# Patient Record
Sex: MALE | Race: WHITE | NOT HISPANIC OR LATINO | Employment: FULL TIME | URBAN - METROPOLITAN AREA
[De-identification: names, ages, dates, MRNs, and addresses within clinical notes are randomized per-mention and may not be internally consistent; named-entity substitution may affect disease eponyms.]

---

## 2017-11-28 ENCOUNTER — ALLSCRIPTS OFFICE VISIT (OUTPATIENT)
Dept: OTHER | Facility: OTHER | Age: 52
End: 2017-11-28

## 2017-11-29 NOTE — PROGRESS NOTES
Assessment    1  Acute bronchitis with bronchospasm (466 0) (J20 9)    Plan  Acute bronchitis with bronchospasm    · Azithromycin 250 MG Oral Tablet; TAKE 2 TABLETS ON DAY 1 THEN TAKE 1TABLET A DAY FOR 4 DAYS   · Bromfed DM 30-2-10 MG/5ML Oral Syrup; TAKE 5 ML EVERY 4 TO 6 HOURS ASNEEDED   · PredniSONE 20 MG Oral Tablet; Take 1 tablet twice daily   · ProAir  (90 Base) MCG/ACT Inhalation Aerosol Solution; INHALE 1 TO 2PUFFS EVERY 4 TO 6 HOURS AS NEEDED    Discussion/Summary  Discussion Summary:   Start abx, steroid  Use cough med and use inhaler as needed  if not better in 1 wk  Medication SE Review and Pt Understands Tx: Possible side effects of new medications were reviewed with the patient/guardian today  The treatment plan was reviewed with the patient/guardian  The patient/guardian understands and agrees with the treatment plan      Chief Complaint  Chief Complaint Free Text Note Form: Patient seen in office today to re-establish as a patient due to being sick - productive cough with clear sputum for over a week, congestion and runny nose  He stated that he has been taking Theraflu w/o relief  History of Present Illness  HPI: 46year old here for cough, congestion  Symptoms improved but then got worse  States he is a slight asthmatic and has been having a little trouble breathing  No fever or muscle aches  Theraflu with out relief  Review of Systems  Complete-Male:  Constitutional: feeling poorly-- and-- feeling tired, but-- no fever-- and-- no chills  ENT: as noted in HPI  Cardiovascular: No complaints of slow heart rate, no fast heart rate, no chest pain, no palpitations, no leg claudication, no lower extremity  Respiratory: as noted in HPI  Gastrointestinal: No complaints of abdominal pain, no constipation, no nausea or vomiting, no diarrhea or bloody stools  Active Problems  1  Back pain (724 5) (M54 9)   2  Low back pain (724 2) (M54 5)   3   Morbid or severe obesity due to excess calories (278 01) (E66 01)   4  Polydipsia (783 5) (R63 1)    Past Medical History  1  History of low back pain (V13 59) (Z87 39)  Active Problems And Past Medical History Reviewed: The active problems and past medical history were reviewed and updated today  Surgical History  1  History of Neuroplasty Decompression Median Nerve At Carpal Tunnel  Surgical History Reviewed: The surgical history was reviewed and updated today  Family History  Mother    1  No pertinent family history  Father    2  No pertinent family history    Social History   · Alcohol use  Social History Reviewed: The social history was reviewed and updated today  Current Meds   1  No Reported Medications Recorded    Allergies  1  No Known Drug Allergies    Vitals  Vital Signs    Recorded: 37CCT0669 03:16PM   Temperature 98 6 F   Heart Rate 72   Systolic 624   Diastolic 76   Height 5 ft 8 5 in   Weight 224 lb 6 08 oz   BMI Calculated 33 62   BSA Calculated 2 15   O2 Saturation 98       Physical Exam   Constitutional  General appearance: No acute distress, well appearing and well nourished  Ears, Nose, Mouth, and Throat  Otoscopic examination: Tympanic membrance translucent with normal light reflex  Canals patent without erythema  Oropharynx: Normal with no erythema, edema, exudate or lesions  Pulmonary  Respiratory effort: Abnormal   Respiratory Findings: dry cough  Auscultation of lungs: Abnormal   Auscultation of the lungs revealed expiratory wheezing  Cardiovascular  Auscultation of heart: Normal rate and rhythm, normal S1 and S2, without murmurs           Signatures   Electronically signed by : Ford Aj MD; Nov 28 2017  3:30PM EST                       (Author)

## 2018-01-13 VITALS
BODY MASS INDEX: 33.23 KG/M2 | DIASTOLIC BLOOD PRESSURE: 76 MMHG | OXYGEN SATURATION: 98 % | TEMPERATURE: 98.6 F | WEIGHT: 224.38 LBS | SYSTOLIC BLOOD PRESSURE: 116 MMHG | HEART RATE: 72 BPM | HEIGHT: 69 IN

## 2021-04-15 DIAGNOSIS — Z23 ENCOUNTER FOR IMMUNIZATION: ICD-10-CM

## 2021-08-30 ENCOUNTER — OFFICE VISIT (OUTPATIENT)
Dept: FAMILY MEDICINE CLINIC | Facility: CLINIC | Age: 56
End: 2021-08-30
Payer: COMMERCIAL

## 2021-08-30 VITALS
BODY MASS INDEX: 33.95 KG/M2 | DIASTOLIC BLOOD PRESSURE: 88 MMHG | HEART RATE: 76 BPM | OXYGEN SATURATION: 97 % | TEMPERATURE: 98.2 F | HEIGHT: 68 IN | WEIGHT: 224 LBS | SYSTOLIC BLOOD PRESSURE: 130 MMHG

## 2021-08-30 DIAGNOSIS — Z12.5 SCREENING FOR PROSTATE CANCER: ICD-10-CM

## 2021-08-30 DIAGNOSIS — Z13.220 SCREENING, LIPID: ICD-10-CM

## 2021-08-30 DIAGNOSIS — Z13.1 SCREENING FOR DIABETES MELLITUS: ICD-10-CM

## 2021-08-30 DIAGNOSIS — L30.4 INTERTRIGO: ICD-10-CM

## 2021-08-30 DIAGNOSIS — M62.830 LUMBAR PARASPINAL MUSCLE SPASM: Primary | ICD-10-CM

## 2021-08-30 DIAGNOSIS — M24.822 ELBOW LOCKING, LEFT: ICD-10-CM

## 2021-08-30 PROCEDURE — 1036F TOBACCO NON-USER: CPT | Performed by: FAMILY MEDICINE

## 2021-08-30 PROCEDURE — 99204 OFFICE O/P NEW MOD 45 MIN: CPT | Performed by: FAMILY MEDICINE

## 2021-08-30 PROCEDURE — 3008F BODY MASS INDEX DOCD: CPT | Performed by: FAMILY MEDICINE

## 2021-08-30 PROCEDURE — 3725F SCREEN DEPRESSION PERFORMED: CPT | Performed by: FAMILY MEDICINE

## 2021-08-30 RX ORDER — METHOCARBAMOL 500 MG/1
500 TABLET, FILM COATED ORAL 3 TIMES DAILY PRN
Qty: 20 TABLET | Refills: 0 | Status: SHIPPED | OUTPATIENT
Start: 2021-08-30 | End: 2022-03-24

## 2021-08-30 RX ORDER — KETOCONAZOLE 20 MG/G
CREAM TOPICAL DAILY
Qty: 15 G | Refills: 0 | Status: SHIPPED | OUTPATIENT
Start: 2021-08-30 | End: 2022-03-24

## 2021-08-30 NOTE — PROGRESS NOTES
Nigel Power 1965 male MRN: [id number]      ASSESSMENT/PLAN  Problem List Items Addressed This Visit     None      Visit Diagnoses     Lumbar paraspinal muscle spasm    -  Primary    Relevant Medications    methocarbamol (ROBAXIN) 500 mg tablet    Intertrigo        Relevant Medications    ketoconazole (NIZORAL) 2 % cream    Elbow locking, left        Relevant Orders    XR elbow 2 vw left    Ambulatory referral to Orthopedic Surgery    Screening for diabetes mellitus        Relevant Orders    Comprehensive metabolic panel    Screening, lipid        Relevant Orders    Lipid panel    Screening for prostate cancer        Relevant Orders    PSA, Total Screen        Back pain -- consistent with paraspinal muscle injury  Trial Robaxin -- reviewed possible ADRs including somnolence, encouraged not to take prior to work or driving  Also encouraged PT, which pt defers at this time  Intertrigo -- start topical anti-fungal     Elbow locking -- likely secondary to arthritis; will XR and refer to Ortho     BP WNL   BMI as below   CMP + Lipids to screen for HLD, DM   HIV, Hep C screening deferred per pt request   PSA ordered   CRC DUE -- pt defers   Encouraged regular dental and eye exams     BMI Counseling: Body mass index is 34 06 kg/m²  The BMI is above normal  Nutrition recommendations include 3-5 servings of fruits/vegetables daily  Exercise recommendations include exercising 3-5 times per week  No future appointments  SUBJECTIVE  CC: groin pull and Back Pain      HPI:  Nigel Power is a 64 y o  male who presents to establish care  History reviewed and updated as below       Was pulling a pallet of ice and developed pain in his R groin and low back about 1 month ago   Initially couldn't put pressure on his leg, but this has improved   Does have a history of sciatica flare ups   No GI/ symptoms   Tried a muscle relaxer once, with minimal relief     Has a patch of irritated skin on his L inner thigh, gets itchy/flakey   Has been present for 1-2 years   Constant, but sometimes flares up worse than at other times     L elbow, has difficulty straightening back out x3 weeks ago   Pt states he was told he has an extra bone in his elbow     Has a spot that sticks out at the bottom of rib cage -- has been present for "years", but seems more pronounced lately   Sometimes hurts more when he bends over     Review of Systems   Constitutional: Negative for unexpected weight change  HENT: Negative for congestion, ear pain, rhinorrhea and sore throat  Eyes: Negative for visual disturbance (wears glasses )  Respiratory: Negative for cough and shortness of breath ("slight asthmatic" -- when it is very hot or cold, can have asthma attacks)  Cardiovascular: Negative for chest pain, palpitations and leg swelling  Gastrointestinal: Negative for abdominal pain, constipation and diarrhea  Denies stool incontinence/retention    Endocrine: Negative for polyuria  Genitourinary: Negative for dysuria  Denies urinary incontinence/retention    Musculoskeletal: Positive for back pain and myalgias  Skin: Positive for rash  Neurological: Negative for dizziness and headaches  Psychiatric/Behavioral: Negative for sleep disturbance  Historical Information   The patient history was reviewed and updated as follows:    History reviewed  No pertinent past medical history    Past Surgical History:   Procedure Laterality Date    APPENDECTOMY      HAND SURGERY Right     nerve damage     Family History   Problem Relation Age of Onset    Dementia Mother       Social History   Social History     Substance and Sexual Activity   Alcohol Use Yes    Comment: Occasionally      Social History     Substance and Sexual Activity   Drug Use Not Currently     Social History     Tobacco Use   Smoking Status Never Smoker   Smokeless Tobacco Former User       Medications:     Current Outpatient Medications:     ketoconazole (NIZORAL) 2 % cream, Apply topically daily, Disp: 15 g, Rfl: 0    methocarbamol (ROBAXIN) 500 mg tablet, Take 1 tablet (500 mg total) by mouth 3 (three) times a day as needed for muscle spasms, Disp: 20 tablet, Rfl: 0  No Known Allergies    OBJECTIVE    Vitals:   Vitals:    08/30/21 1352   BP: 130/88   Pulse: 76   Temp: 98 2 °F (36 8 °C)   SpO2: 97%   Weight: 102 kg (224 lb)   Height: 5' 8" (1 727 m)           Physical Exam  Vitals and nursing note reviewed  Constitutional:       General: He is not in acute distress  Appearance: Normal appearance  HENT:      Head: Normocephalic and atraumatic  Right Ear: Tympanic membrane, ear canal and external ear normal       Left Ear: Tympanic membrane, ear canal and external ear normal       Nose: Nose normal       Mouth/Throat:      Mouth: Mucous membranes are moist       Pharynx: No oropharyngeal exudate or posterior oropharyngeal erythema  Eyes:      Conjunctiva/sclera: Conjunctivae normal    Cardiovascular:      Rate and Rhythm: Normal rate and regular rhythm  Pulmonary:      Effort: Pulmonary effort is normal  No respiratory distress  Breath sounds: Normal breath sounds  Abdominal:      General: Bowel sounds are normal  There is no distension  Palpations: Abdomen is soft  Tenderness: There is no abdominal tenderness  Musculoskeletal:      Right lower leg: No edema  Left lower leg: No edema  Comments: Non tender to palpation over length of spine  Tender along lower thoracic and lumbar paraspinals     L elbow non-tender to palpation   No significant crepitus noted with F/E   Lymphadenopathy:      Cervical: No cervical adenopathy  Skin:     General: Skin is warm and dry  Comments: Large confluent area of thickened, hyperpigmented skin suggestive of intertrigo    Neurological:      General: No focal deficit present  Mental Status: He is alert     Psychiatric:         Mood and Affect: Mood normal  DO Aaliyah Wor[facility]   8/30/2021  2:32 PM

## 2022-03-24 ENCOUNTER — OFFICE VISIT (OUTPATIENT)
Dept: FAMILY MEDICINE CLINIC | Facility: CLINIC | Age: 57
End: 2022-03-24
Payer: COMMERCIAL

## 2022-03-24 VITALS
DIASTOLIC BLOOD PRESSURE: 80 MMHG | HEIGHT: 68 IN | TEMPERATURE: 97.2 F | SYSTOLIC BLOOD PRESSURE: 110 MMHG | WEIGHT: 213 LBS | OXYGEN SATURATION: 97 % | BODY MASS INDEX: 32.28 KG/M2 | HEART RATE: 63 BPM

## 2022-03-24 DIAGNOSIS — F43.21 GRIEF: ICD-10-CM

## 2022-03-24 DIAGNOSIS — G89.29 CHRONIC RIGHT SHOULDER PAIN: Primary | ICD-10-CM

## 2022-03-24 DIAGNOSIS — M25.511 CHRONIC RIGHT SHOULDER PAIN: Primary | ICD-10-CM

## 2022-03-24 PROCEDURE — 1036F TOBACCO NON-USER: CPT | Performed by: FAMILY MEDICINE

## 2022-03-24 PROCEDURE — 3008F BODY MASS INDEX DOCD: CPT | Performed by: FAMILY MEDICINE

## 2022-03-24 PROCEDURE — 99214 OFFICE O/P EST MOD 30 MIN: CPT | Performed by: FAMILY MEDICINE

## 2022-03-24 NOTE — PROGRESS NOTES
Seth Watters 1965 male MRN: [id number]      ASSESSMENT/PLAN  Problem List Items Addressed This Visit     None      Visit Diagnoses     Chronic right shoulder pain    -  Primary    Relevant Orders    XR shoulder 2+ vw right    Ambulatory Referral to Orthopedic Surgery    Grief        Relevant Medications    sertraline (Zoloft) 50 mg tablet        Exam suggestive of rotator cuff pathology  Will get XR and refer to Ortho for further evaluation  Possible that tremor is related, as it is intermittent, new onset, and in same extremity as worsening shoulder dysfunction  Will continue to monitor  Reviewed various options for management of depression/anxiety in setting of grief including therapy and medication  Pt agreeable to medication  Start Zoloft  Discussed possible ADRs including GI upset, somnolence, initial worsening of symptoms  Reviewed delayed onset to max therapeutic potential  F/u in 4 weeks to monitor, to call if not tolerating prior  No future appointments  SUBJECTIVE  CC: Shaking (hands are both shaking  Pt states when working not so bad but once he sits down his hands shake "uncontrollably"), Shoulder Pain (right shoulder pain-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-thinks that they have injured it  limited ROM ), Back Pain (low back pain), and Follow-up (recently lost wife and is having a difficult time  has not had a true check up in about 10 years )      HPI:  Seth Watters is a 64 y o  male who presents due to multiple concerns as detailed below  Tremor -- R hand constantly shaking -- started about 2-3 weeks ago; some days it is not there, can control it while he is working  Did have multiple surgeries on the hand -- one stent, carpal tunnel x3   Shoulder Pain -- R "I screwed up something bad" -- has been bothering him for about 1 year; radiates into neck and arm; does have a history of dislocation as a child  Notes that it sticks out more in the front     No numbness/tingling in his hand  Sometimes arm feels cold to touch  Grief -- "I think my nerves are just about gone"; his wife passed at the end of February; has resources through work  Whenever he "peels back layers" through the house, it gets you  Having trouble sleeping  Only eating once per day because he keeps working  Review of Systems   Musculoskeletal: Positive for arthralgias  Neurological: Positive for tremors  Psychiatric/Behavioral: Positive for sleep disturbance  The patient is nervous/anxious  Historical Information   The patient history was reviewed and updated as follows:    History reviewed  No pertinent past medical history  Past Surgical History:   Procedure Laterality Date    APPENDECTOMY      HAND SURGERY Right     nerve damage     Family History   Problem Relation Age of Onset    Dementia Mother       Social History   Social History     Substance and Sexual Activity   Alcohol Use Yes    Comment: Occasionally      Social History     Substance and Sexual Activity   Drug Use Not Currently     Social History     Tobacco Use   Smoking Status Never Smoker   Smokeless Tobacco Former User       Medications:     Current Outpatient Medications:     sertraline (Zoloft) 50 mg tablet, Take 1/2 tablet by mouth daily x7 days, then take 1 tablet by mouth daily, Disp: 30 tablet, Rfl: 1  No Known Allergies    OBJECTIVE    Vitals:   Vitals:    03/24/22 1530   BP: 110/80   BP Location: Left arm   Patient Position: Sitting   Cuff Size: Large   Pulse: 63   Temp: (!) 97 2 °F (36 2 °C)   SpO2: 97%   Weight: 96 6 kg (213 lb)   Height: 5' 8" (1 727 m)           Physical Exam  Vitals and nursing note reviewed  Constitutional:       General: He is not in acute distress  Appearance: Normal appearance  HENT:      Head: Normocephalic and atraumatic  Pulmonary:      Effort: Pulmonary effort is normal  No respiratory distress     Musculoskeletal:      Comments: R shoulder  No obvious deformity Tender to palpation over AC joint   ROM decreased with IR due to pain  Equivocal Gaby Pritchett's, (+) Empty Can     R hand  No tremor during exam   Capillary refill <2 seconds in all digits   Radial pulse 2+   Neurological:      General: No focal deficit present  Mental Status: He is alert     Psychiatric:         Mood and Affect: Mood normal                     Ciarra Navraro DO  [facility]   3/24/2022  3:53 PM

## 2022-03-25 ENCOUNTER — APPOINTMENT (OUTPATIENT)
Dept: RADIOLOGY | Facility: CLINIC | Age: 57
End: 2022-03-25
Payer: COMMERCIAL

## 2022-03-25 DIAGNOSIS — M25.511 CHRONIC RIGHT SHOULDER PAIN: ICD-10-CM

## 2022-03-25 DIAGNOSIS — G89.29 CHRONIC RIGHT SHOULDER PAIN: ICD-10-CM

## 2022-03-25 PROCEDURE — 73030 X-RAY EXAM OF SHOULDER: CPT

## 2022-04-14 VITALS
SYSTOLIC BLOOD PRESSURE: 121 MMHG | BODY MASS INDEX: 32.28 KG/M2 | DIASTOLIC BLOOD PRESSURE: 83 MMHG | WEIGHT: 213 LBS | HEART RATE: 64 BPM | HEIGHT: 68 IN

## 2022-04-14 DIAGNOSIS — M25.511 CHRONIC RIGHT SHOULDER PAIN: ICD-10-CM

## 2022-04-14 DIAGNOSIS — G89.29 CHRONIC RIGHT SHOULDER PAIN: ICD-10-CM

## 2022-04-14 DIAGNOSIS — M75.81 TENDINITIS OF RIGHT ROTATOR CUFF: Primary | ICD-10-CM

## 2022-04-14 DIAGNOSIS — M75.41 IMPINGEMENT SYNDROME OF RIGHT SHOULDER: ICD-10-CM

## 2022-04-14 DIAGNOSIS — M62.838 TRAPEZIUS MUSCLE SPASM: ICD-10-CM

## 2022-04-14 PROCEDURE — 99203 OFFICE O/P NEW LOW 30 MIN: CPT | Performed by: PHYSICIAN ASSISTANT

## 2022-04-14 PROCEDURE — 20610 DRAIN/INJ JOINT/BURSA W/O US: CPT | Performed by: PHYSICIAN ASSISTANT

## 2022-04-14 RX ORDER — METHYLPREDNISOLONE ACETATE 40 MG/ML
2 INJECTION, SUSPENSION INTRA-ARTICULAR; INTRALESIONAL; INTRAMUSCULAR; SOFT TISSUE
Status: COMPLETED | OUTPATIENT
Start: 2022-04-14 | End: 2022-04-14

## 2022-04-14 RX ORDER — LIDOCAINE HYDROCHLORIDE 10 MG/ML
2 INJECTION, SOLUTION INFILTRATION; PERINEURAL
Status: COMPLETED | OUTPATIENT
Start: 2022-04-14 | End: 2022-04-14

## 2022-04-14 RX ORDER — BUPIVACAINE HYDROCHLORIDE 2.5 MG/ML
2 INJECTION, SOLUTION INFILTRATION; PERINEURAL
Status: COMPLETED | OUTPATIENT
Start: 2022-04-14 | End: 2022-04-14

## 2022-04-14 RX ADMIN — METHYLPREDNISOLONE ACETATE 2 ML: 40 INJECTION, SUSPENSION INTRA-ARTICULAR; INTRALESIONAL; INTRAMUSCULAR; SOFT TISSUE at 09:22

## 2022-04-14 RX ADMIN — LIDOCAINE HYDROCHLORIDE 2 ML: 10 INJECTION, SOLUTION INFILTRATION; PERINEURAL at 09:22

## 2022-04-14 RX ADMIN — BUPIVACAINE HYDROCHLORIDE 2 ML: 2.5 INJECTION, SOLUTION INFILTRATION; PERINEURAL at 09:22

## 2022-04-14 NOTE — PROGRESS NOTES
Patient Name:  Modesto Ribeiro  MRN:  [id number]    [address] [place] [address]     1  Tendinitis of right rotator cuff  -     Ambulatory Referral to Physical Therapy; Future    2  Chronic right shoulder pain  -     Ambulatory Referral to Orthopedic Surgery  -     Ambulatory Referral to Physical Therapy; Future    3  Trapezius muscle spasm  -     Ambulatory Referral to Physical Therapy; Future        64 y o  male with Right shoulder rotator cuff tendonitis and upper trapezius spasm  X-rays reviewed in office today with patient  Nonoperative management discussed with patient regarding rotator cuff tenonitis and upper trapezius spasm including outpatient PT to work on proper lifting mechanics, graston/manual therapy upper trapezius, strengthening of periscapular/postural and rotator cuff musculature, full passive and active range of motion, corticosteroid injections, topical voltaren vs OTC oral analgesics as needed for pain relief  At this time patient verbalizes difficulty wit schedule to get into outpatient PT; advised patient this will be cornerstone of improvement of pain and improving strength  He would also like to receive corticosteroid injection today  Risks and benefits of corticosteroid injection were discussed in detail  Risks including:  Post injection pain, elevation in blood sugar, skin discoloration, fatty atrophy, and infection were discussed in detail  The patient understood and elected to proceed forward  After sterile preparation the Right subacromial bursa was injected with 2 cc of 1% lidocaine, 2 cc of 0 25% bupivacaine, and 2 cc of Depo-Medrol  The patient tolerated the procedure and no immediate complications were noted  The patient was instructed to ice and elevate the injection site, limit strenuous activity for the next 24-48 hours, and contact us if there were any questions or concerns prior to their follow-up appointment    They were also instructed to contact their primary care physician to discuss elevated blood sugar; patient denies history of diabetes  I will see the patient back in 8 weeks for reevaluation of Right shoulder  If Right shoulder symptoms persist with associated weakness and pain, can consider additional imaging if indicated  Chief Complaint     Right shoulder pain    History of the Present Illness     Aurora Stiles is a RHD 64 y o  male with Right shoulder/neck pain ongoing for a couple months  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 her in a wheel chair  He remembers a few times at the grocery store where he was pushing the wheelchair and pulling a cart which caused some neck/upper trapezius pain  He does admit to pain, which is tolerable; denies weakness  Admits to pain if Right side lying  Today, patient locates pain to upper trapezius and lateral aspect of Right shoulder  He occasionally administers 3 tablets of 500mg Tylenol  Admits to 3 year history of 5ft fall onto Right shoulder without complications or fracture; he did not seek out medical care after fall  He also admits to history of Right shoulder subluxation as a child  Review of Systems     Review of Systems   Constitutional: Negative for chills and fever  HENT: Negative for ear pain and sore throat  Eyes: Negative for pain and visual disturbance  Respiratory: Negative for cough and shortness of breath  Cardiovascular: Negative for chest pain and palpitations  Gastrointestinal: Negative for abdominal pain and vomiting  Genitourinary: Negative for dysuria and hematuria  Musculoskeletal: Negative for arthralgias and back pain  Skin: Negative for color change and rash  Neurological: Negative for seizures and syncope  All other systems reviewed and are negative  Physical Exam     /83   Pulse 64   Ht 5' 8" (1 727 m)   Wt 96 6 kg (213 lb)   BMI 32 39 kg/m²     Right Shoulder:    Active range of motion   150-160 degrees forward flexion  150-160 degrees abduction  70 degrees external rotation   4 level restriction internal rotation      Passive range of motion   160-170 degrees of forward flexion   There is tenderness present over the upper trapezius, AC joint, proximal biceps tendon,   There is 5/5 strength with external rotation testing at the side  Empty can testing is negative   Belly press test is negative  Francois test is positive  [place]'s test is positive   Speed's test is Negative   Positive cross arm adduction test  The patient is neurovascularly intact distally in the extremity  Eyes:  Anicteric sclerae  Neck:  Supple  Lungs:  Normal respiratory effort  Cardiovascular:  Capillary refill is less than 2 seconds  Skin:  Intact without erythema  Neurologic:  Sensation grossly intact to light touch  Psychiatric:  Mood and affect are appropriate  Data Review     I have personally reviewed pertinent films in PACS, and my interpretation follows:    X-rays taken of Right shoulder demonstrates minimal to mild early glenohumeral osteoarthritis without fracture or dislocation  AC joint osteoarthritis noted  History reviewed  No pertinent past medical history  Past Surgical History:   Procedure Laterality Date    APPENDECTOMY      HAND SURGERY Right     nerve damage       No Known Allergies    Current Outpatient Medications on File Prior to Visit   Medication Sig Dispense Refill    sertraline (Zoloft) 50 mg tablet Take 1/2 tablet by mouth daily x7 days, then take 1 tablet by mouth daily 30 tablet 1     No current facility-administered medications on file prior to visit         Social History     Tobacco Use    Smoking status: Never Smoker    Smokeless tobacco: Former User   Vaping Use    Vaping Use: Never used   Substance Use Topics    Alcohol use: Yes     Comment: Occasionally     Drug use: Not Currently       Family History   Problem Relation Age of Onset    Dementia Mother              Procedures Performed     Large joint arthrocentesis: R subacromial bursa  Universal Protocol:  Consent given by: patient  Patient identity confirmed: verbally with patient    Supporting Documentation  Indications: pain   Procedure Details  Location: shoulder - R subacromial bursa  Needle size: 22 G  Approach: lateral  Medications administered: 2 mL bupivacaine 0 25 %; 2 mL lidocaine 1 %; 2 mL methylPREDNISolone acetate 40 mg/mL    Patient tolerance: patient tolerated the procedure well with no immediate complications  Dressing:  Sterile dressing applied              Juan Simpson PA-C

## 2022-04-21 ENCOUNTER — OFFICE VISIT (OUTPATIENT)
Dept: FAMILY MEDICINE CLINIC | Facility: CLINIC | Age: 57
End: 2022-04-21
Payer: COMMERCIAL

## 2022-04-21 VITALS
TEMPERATURE: 97 F | BODY MASS INDEX: 32.28 KG/M2 | SYSTOLIC BLOOD PRESSURE: 118 MMHG | DIASTOLIC BLOOD PRESSURE: 78 MMHG | OXYGEN SATURATION: 99 % | HEIGHT: 68 IN | WEIGHT: 213 LBS | HEART RATE: 55 BPM

## 2022-04-21 DIAGNOSIS — F43.21 GRIEF: Primary | ICD-10-CM

## 2022-04-21 DIAGNOSIS — R25.2 LEG CRAMPS: ICD-10-CM

## 2022-04-21 PROCEDURE — 99214 OFFICE O/P EST MOD 30 MIN: CPT | Performed by: FAMILY MEDICINE

## 2022-04-21 PROCEDURE — 1036F TOBACCO NON-USER: CPT | Performed by: FAMILY MEDICINE

## 2022-04-21 PROCEDURE — 3008F BODY MASS INDEX DOCD: CPT | Performed by: FAMILY MEDICINE

## 2022-04-21 RX ORDER — SERTRALINE HYDROCHLORIDE 100 MG/1
100 TABLET, FILM COATED ORAL DAILY
Qty: 30 TABLET | Refills: 1 | Status: SHIPPED | OUTPATIENT
Start: 2022-04-21 | End: 2022-07-18 | Stop reason: SDUPTHER

## 2022-04-21 NOTE — PROGRESS NOTES
Carmelo Hoyos 1965 male MRN: [id number]      ASSESSMENT/PLAN  Problem List Items Addressed This Visit     None      Visit Diagnoses     Grief    -  Primary    Relevant Medications    sertraline (Zoloft) 100 mg tablet    Leg cramps            Some improvement in symptoms with Zoloft, though still persistent -- trial increase to 100 mg daily and f/u in 4 weeks to monitor  If sleep disturbance still present, could consider sleep aid such as Trazodone or Hydroxyzine  Encouraged cutting back on soda and increasing water intake to help with leg cramping  Future Appointments   Date Time Provider Maribell Perry   4/21/2022  8:40 AM DO LETY Swanson [facility]   4/28/2022 11:30 AM Flori Walls [facility]   6/9/2022  9:15 AM DO CYNDY Spencer [facility]          SUBJECTIVE  CC: Follow-up (Feels like he's doing good  Still waking up multiple times at night ) and leg cramps (pt complaining of leg cramps at night in both legs)      HPI:  Carmelo Hoyos is a 64 y o  male who presents for medication follow up  Initiated on Zoloft -- tolerating without issue; still having difficulty sleeping at night (falling asleep without issue, but wakes up and can't get back to sleep)  Does note some improvement in symptoms     Pt is having leg cramps at night -- he has been drinking a lot of soda and no water    Review of Systems   Musculoskeletal: Positive for myalgias (leg cramps at night)  Psychiatric/Behavioral: Positive for sleep disturbance  Historical Information   The patient history was reviewed and updated as follows:    No past medical history on file    Past Surgical History:   Procedure Laterality Date    APPENDECTOMY      HAND SURGERY Right     nerve damage     Family History   Problem Relation Age of Onset    Dementia Mother       Social History   Social History     Substance and Sexual Activity   Alcohol Use Yes    Comment: Occasionally      Social History     Substance and Sexual Activity   Drug Use Not Currently     Social History     Tobacco Use   Smoking Status Never Smoker   Smokeless Tobacco Former User       Medications:     Current Outpatient Medications:     sertraline (Zoloft) 100 mg tablet, Take 1 tablet (100 mg total) by mouth daily, Disp: 30 tablet, Rfl: 1  No Known Allergies    OBJECTIVE    Vitals:   Vitals:    04/21/22 0819   BP: 118/78   BP Location: Left arm   Patient Position: Sitting   Cuff Size: Large   Pulse: 55   Temp: (!) 97 °F (36 1 °C)   SpO2: 99%   Weight: 96 6 kg (213 lb)   Height: 5' 8" (1 727 m)           Physical Exam  Vitals and nursing note reviewed  Constitutional:       General: He is not in acute distress  Appearance: Normal appearance  HENT:      Head: Normocephalic and atraumatic  Pulmonary:      Effort: Pulmonary effort is normal  No respiratory distress  Neurological:      General: No focal deficit present  Mental Status: He is alert     Psychiatric:         Mood and Affect: Mood normal                     DO Luca Chicas [address] [facility]   4/21/2022  8:33 AM

## 2022-04-28 ENCOUNTER — EVALUATION (OUTPATIENT)
Dept: PHYSICAL THERAPY | Facility: CLINIC | Age: 57
End: 2022-04-28
Payer: COMMERCIAL

## 2022-04-28 DIAGNOSIS — M25.511 CHRONIC RIGHT SHOULDER PAIN: ICD-10-CM

## 2022-04-28 DIAGNOSIS — G89.29 CHRONIC RIGHT SHOULDER PAIN: ICD-10-CM

## 2022-04-28 DIAGNOSIS — M75.81 TENDINITIS OF RIGHT ROTATOR CUFF: Primary | ICD-10-CM

## 2022-04-28 DIAGNOSIS — M62.838 TRAPEZIUS MUSCLE SPASM: ICD-10-CM

## 2022-04-28 PROCEDURE — 97110 THERAPEUTIC EXERCISES: CPT | Performed by: PHYSICAL THERAPIST

## 2022-04-28 PROCEDURE — 97162 PT EVAL MOD COMPLEX 30 MIN: CPT | Performed by: PHYSICAL THERAPIST

## 2022-04-28 NOTE — PROGRESS NOTES
PT Evaluation     Today's date: 2022  Patient name: Claudine Soto  : 1965  MRN: [id number]  Referring provider: Rupal Graham PA-C  Dx:   Encounter Diagnosis     ICD-10-CM    1  Tendinitis of right rotator cuff  M75 81 Ambulatory Referral to Physical Therapy   2  Chronic right shoulder pain  M25 511 Ambulatory Referral to Physical Therapy    G89 29    3  Trapezius muscle spasm  M62 838 Ambulatory Referral to Physical Therapy       Start Time: 1130  Stop Time: 1215  Total time in clinic (min): 45 minutes    Assessment  Assessment details: Claudine Soto is a 64 y o  male who presents with pain, decreased strength, decreased ROM, decreased joint mobility and postural dysfunction  Due to these impairments, Patient has difficulty performing a/iadls, recreational activities, work-related activities and engaging in social activities  Patient's clinical presentation is consistent with their referring diagnosis of right RC tendinitis and UT strain  Patient would benefit from skilled physical therapy to address their aforementioned impairments, improve their level of function and to improve their overall quality of life  Impairments: abnormal muscle firing, abnormal or restricted ROM, activity intolerance, impaired physical strength, lacks appropriate home exercise program, pain with function and poor posture   Understanding of Dx/Px/POC: excellent   Prognosis: good    Goals  Short Term Goals: to be achieved by 4 weeks  1) Patient to be independent with basic HEP  2) Decrease pain to 5/10 at it's worst   3) Increase UE strength by 1/2 MMT grade in all deficient planes  4) Increase UE ROM by > 5 deg in all deficient planes  5) Increase cervical ROM by > 5 deg in all deficient planes  6) Patient to report decreased sleep interruption secondary to pain  Long Term Goals: to be achieved by discharge  1) FOTO equal to or greater than TBD  2) Patient to be independent with comprehensive HEP    3) Abolish pain for improved quality of life  4) Increase UE strength to 5/5 MMT grade in all deficient planes to improve a/iadls  5) Increase UE ROM to within 5 deg of contralateral UE to improve a/iadls  6) Increase cervical ROM to [facility] all planes to improve a/iadls  7) Patient to report no sleep interruption secondary to pain  Plan  Plan details: Patient anticipates returning for 1-2 additional visits to establish HEP and will potentially transition to home at that time  Patient would benefit from: skilled PT  Planned modality interventions: biofeedback, cryotherapy, hydrotherapy, unattended electrical stimulation, thermotherapy: hydrocollator packs and low level laser therapy  Planned therapy interventions: activity modification, ADL retraining, behavior modification, body mechanics training, functional ROM exercises, home exercise program, IADL retraining, joint mobilization, manual therapy, massage, neuromuscular re-education, patient education, postural training, strengthening, stretching, therapeutic activities and therapeutic exercise  Frequency: 1-2x week  Duration in weeks: 12  Plan of Care beginning date: 4/28/2022  Plan of Care expiration date: 7/21/2022  Treatment plan discussed with: patient        Subjective Evaluation    History of Present Illness  Mechanism of injury: Patient reports that several months ago his wife was getting sick and was in a w/c  Patient had to care for his wife and perform excessive activity  Patient at one point reached behind him to pull a heavy grocery cart and felt something give in his right shoulder  Patient developed gradually worsening pain and had difficulty turning his head  Patient ignored his symptoms, but eventually went to ortho where he received a steroid injection  Since then his pain and right shoulder mobility have significantly improved  Patient does continue to have right shoulder pain and cervical stiffness   Patient finds that his sleep is limited, but mostly because of increased stress since his wife has passed  Patient denies experiencing tingling/numbness, crepitus, perceived weakness, dysphagia, dysarthria, dizziness, diplopia or HA  Patient estimates his overall level of function to be approximately 80-85% of his premorbid norm  Pain  Current pain rating: 3  At best pain ratin  At worst pain rating: 10  Location: right UT, shoulder to lateral deltoid  Quality: sharp  Alleviating factors: Acetomenophen  Exacerbated by: reaching behind back, lifting, turning head  Social Support    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[employer])  Hand dominance: right      Diagnostic Tests  X-ray: abnormal (Right shoulder: "Mild to moderate osteoarthritis of the glenohumeral and acromioclavicular joints ")  Treatments  Previous treatment: injection treatment  Patient Goals  Patient goals for therapy: decreased pain and increased motion  Patient goal: independence with HEP        Objective     Postural Observations  Seated posture: fair  Standing posture: fair        Palpation   Left   Tenderness of the upper trapezius  Trigger point to upper trapezius  Right   Tenderness of the upper trapezius  Trigger point to upper trapezius  Tenderness   Cervical Spine   No tenderness in the spinous process, left transverse process and right transverse process  Left Elbow   No tenderness in the lateral epicondyle  Right Elbow   Tenderness in the lateral epicondyle  Left Wrist/Hand   No tenderness in the lateral epicondyle  Right Wrist/Hand   Tenderness in the lateral epicondyle       Active Range of Motion   Cervical/Thoracic Spine       Cervical    Flexion: 48 degrees   Extension: 35 degrees      Left lateral flexion: 18 degrees      Right lateral flexion: 16 degrees     with pain  Left rotation: 60 degrees  Right rotation: 38 degrees    with pain  Left Shoulder   Flexion: WFL  Abduction: WFL  External rotation BTH: T4   Internal rotation BTB: T6     Right Shoulder   Flexion: WFL and with pain  Abduction: WFL and with pain  External rotation BTH: T3 with pain  Internal rotation BTB: T8     Joint Play     Hypomobile: C1, C2, C3, C4, C5, C6 and C7     Strength/Myotome Testing     Left Shoulder     Planes of Motion   Flexion: 5   Abduction: 5   External rotation at 0°: 5   Internal rotation at 0°: 5     Right Shoulder     Planes of Motion   Flexion: 4+   Abduction: 4+   External rotation at 0°: 5   Internal rotation at 0°: 5     Left Elbow   Flexion: 5  Extension: 5    Right Elbow   Flexion: 5  Extension: 5    Left Wrist/Hand   Wrist extension: 5  Wrist flexion: 5    Right Wrist/Hand   Wrist extension: 5  Wrist flexion: 5    Tests   Cervical   Negative vertical compression, alar ligament test, Sharp-Aguila test and VBI  Left   Positive Spurling's Test B  Negative Spurling's Test A  Right   Positive Spurling's Test B  Negative Spurling's Test A  Left Shoulder   Negative ULTT1, ULTT3 and ULTT4  Right Shoulder   Negative ULTT3 and ULTT4  Lumbar   Negative vertical compression  Ambulation     Ambulation: Level Surfaces   Ambulation without assistive device: independent    Observational Gait   Gait: within functional limits            Precautions: standard      Manuals 4/28                                                                Neuro Re-Ed             Webslide: M, L row             Bilateral ER with scapular retraction             Prone row                                                                 Ther Ex             Patient education: HEP review, pathophysiology GR            UBE             UT str  HEP            Corner str  HEP            Sleeper str   HEP            TB IR, ER             ER - s/l             Cervical rotation SNAGS                          Ther Activity                                       Gait Training                                       Modalities Access Code: [email or web address]  URL: [email or web address]  Date: 04/28/2022  Prepared by: Laurier Bernheim    Exercises  · Corner Pec Major Stretch - 1 x daily - 7 x weekly - 3 sets - 1 reps - 30 seconds hold  · Sleeper Stretch - 1 x daily - 7 x weekly - 3 sets - 1 reps - 30 seconds hold  · Seated Cervical Sidebending Stretch - 1 x daily - 7 x weekly - 3 sets - 1 reps - 30 seconds hold

## 2022-05-19 ENCOUNTER — OFFICE VISIT (OUTPATIENT)
Dept: PHYSICAL THERAPY | Facility: CLINIC | Age: 57
End: 2022-05-19
Payer: COMMERCIAL

## 2022-05-19 ENCOUNTER — OFFICE VISIT (OUTPATIENT)
Dept: FAMILY MEDICINE CLINIC | Facility: CLINIC | Age: 57
End: 2022-05-19
Payer: COMMERCIAL

## 2022-05-19 VITALS
HEART RATE: 64 BPM | WEIGHT: 213 LBS | TEMPERATURE: 97.1 F | HEIGHT: 68 IN | BODY MASS INDEX: 32.28 KG/M2 | DIASTOLIC BLOOD PRESSURE: 76 MMHG | SYSTOLIC BLOOD PRESSURE: 112 MMHG | OXYGEN SATURATION: 98 %

## 2022-05-19 DIAGNOSIS — G89.29 CHRONIC RIGHT SHOULDER PAIN: ICD-10-CM

## 2022-05-19 DIAGNOSIS — F43.21 ADJUSTMENT DISORDER WITH DEPRESSED MOOD: ICD-10-CM

## 2022-05-19 DIAGNOSIS — Z28.21 TETANUS, DIPHTHERIA, AND ACELLULAR PERTUSSIS (TDAP) VACCINATION DECLINED: ICD-10-CM

## 2022-05-19 DIAGNOSIS — Z12.11 COLON CANCER SCREENING: ICD-10-CM

## 2022-05-19 DIAGNOSIS — M75.81 TENDINITIS OF RIGHT ROTATOR CUFF: Primary | ICD-10-CM

## 2022-05-19 DIAGNOSIS — M25.511 CHRONIC RIGHT SHOULDER PAIN: ICD-10-CM

## 2022-05-19 DIAGNOSIS — E66.09 CLASS 1 OBESITY DUE TO EXCESS CALORIES WITHOUT SERIOUS COMORBIDITY WITH BODY MASS INDEX (BMI) OF 32.0 TO 32.9 IN ADULT: ICD-10-CM

## 2022-05-19 DIAGNOSIS — M62.838 TRAPEZIUS MUSCLE SPASM: ICD-10-CM

## 2022-05-19 DIAGNOSIS — Z00.00 ANNUAL PHYSICAL EXAM: Primary | ICD-10-CM

## 2022-05-19 PROCEDURE — 97110 THERAPEUTIC EXERCISES: CPT | Performed by: PHYSICAL THERAPIST

## 2022-05-19 PROCEDURE — 3008F BODY MASS INDEX DOCD: CPT | Performed by: FAMILY MEDICINE

## 2022-05-19 PROCEDURE — 1036F TOBACCO NON-USER: CPT | Performed by: FAMILY MEDICINE

## 2022-05-19 PROCEDURE — 99396 PREV VISIT EST AGE 40-64: CPT | Performed by: FAMILY MEDICINE

## 2022-05-19 NOTE — PROGRESS NOTES
PT Discharge    Today's date: 2022  Patient name: Jake Galo  : 1965  MRN: [id number]  Referring provider: Debbi Mena PA-C  Dx:   Encounter Diagnosis     ICD-10-CM    1  Tendinitis of right rotator cuff  M75 81    2  Chronic right shoulder pain  M25 511     G89 29    3  Trapezius muscle spasm  M62 838        Start Time: 1058  Stop Time: 1145  Total time in clinic (min): 47 minutes    Assessment  Assessment details: Since beginning physical therapy, Jamaal Orellana has attended a total number of 2 visits to date and is independent with comprehensive HEP  Patient has been instructed to contact PT if he begins to notice a decline in function or has any questions or concerns  Patient would like to be discharged at this time and transition to home  PT will be discharged  Understanding of Dx/Px/POC: excellent   Prognosis: good    Goals  Short Term Goals: to be achieved by 4 weeks   1) Patient to be independent with basic HEP  MET  2) Decrease pain to 5/10 at it's worst  PROGRESSED, BUT NOT MET  3) Increase UE strength by 1/2 MMT grade in all deficient planes  NOT ASSESSED  4) Increase UE ROM by > 5 deg in all deficient planes  NOT ASSESSED  5) Increase cervical ROM by > 5 deg in all deficient planes  NOT ASSESSED  6) Patient to report decreased sleep interruption secondary to pain  NOT ASSESSED    Long Term Goals: to be achieved by discharge  1) FOTO equal to or greater than 78  MET  2) Patient to be independent with comprehensive HEP  MET  3) Abolish pain for improved quality of life  PROGRESSED, BUT NOT MET  4) Increase UE strength to 5/5 MMT grade in all deficient planes to improve a/iadls  NOT ASSESSED  5) Increase UE ROM to within 5 deg of contralateral UE to improve a/iadls  NOT ASSESSED  6) Increase cervical ROM to [facility] all planes to improve a/iadls  NOT ASSESSED  7) Patient to report no sleep interruption secondary to pain   PROGRESSED, BUT NOT MET    Plan  Planned therapy interventions: home exercise program  Duration in visits: 1  Plan of Care beginning date: 2022  Plan of Care expiration date: 2022  Treatment plan discussed with: family        Subjective Evaluation    History of Present Illness  Mechanism of injury: Patient reports that overall his neck and right shoulder feel better, but can bother him with excessive physical activity  Patient is reporting improved cervical mobility  Patient states that he would like to transition to a comprehensive HEP and be discharged from formal therapy at this time  Pain  Current pain rating: 3  At best pain ratin  At worst pain ratin  Location: right shoulder, UT    Treatments  Current treatment: physical therapy  Patient Goals  Patient goal: [place] with HEP        Objective Objective measurements not updated at this time  Please see IE on 22 for objective data  Flowsheet Rows    Flowsheet Row Most Recent Value   PT/OT G-Codes    Current Score 87   Projected Score 78             Precautions: standard      Manuals                                                                Neuro Re-Ed             Kalli Splinter: M, L row  GTB 2x10 3" ea  Bilateral ER with scapular retraction  GTB 2x10 5" ea  Prone shoulder ext  h abd   10x3" ea  Ther Ex             Patient education: HEP review, pathophysiology GR GR           UBE  3' / 3'           UT str  HEP Reviewed           Corner str  HEP Reviewed           Sleeper str  HEP Reviewed           TB IR, ER  GTB 2x10 ea  ER - s/l  2x10           Cervical rotation SNAGS  10x ea  Ther Activity                                       Gait Training                                       Modalities                                       Access Code: [email or web address]  URL: [email or web address]  Date: 2022  Prepared by: Sarah Lambert  · Corner Pec Major Stretch - 1 x daily - 7 x weekly - 3 sets - 1 reps - 30 seconds hold  · Sleeper Stretch - 1 x daily - 7 x weekly - 3 sets - 1 reps - 30 seconds hold  · Seated Cervical Sidebending Stretch - 1 x daily - 7 x weekly - 3 sets - 1 reps - 30 seconds hold  · Prone Shoulder Extension - Single Arm - 1 x daily - 4 x weekly - 2 sets - 10 reps - 3 seconds hold  · Prone Single Arm Shoulder Horizontal Abduction with Scapular Retraction and Palm Down - 1 x daily - 4 x weekly - 2 sets - 10 reps - 3 seconds hold  · Standing Shoulder Row with Anchored Resistance - 1 x daily - 4 x weekly - 2 sets - 10 reps - 3 seconds hold  · Shoulder extension with resistance - Neutral - 1 x daily - 4 x weekly - 2 sets - 10 reps - 3 seconds hold  · Shoulder External Rotation with Anchored Resistance - 1 x daily - 4 x weekly - 2 sets - 10 reps  · Shoulder External Rotation and Scapular Retraction with Resistance - 1 x daily - 4 x weekly - 2 sets - 10 reps - 5 seconds hold  · Shoulder Internal Rotation with Resistance - 1 x daily - 4 x weekly - 2 sets - 10 reps  · Seated Assisted Cervical Rotation with Towel - 1 x daily - 7 x weekly - 2 sets - 10 reps - 3 seconds hold

## 2022-05-19 NOTE — PROGRESS NOTES
[facility]    NAME: Rusty Carrasco  AGE: 64 y o  SEX: male  : 1965     DATE: 2022     Assessment and Plan:     Problem List Items Addressed This Visit        Other    Adjustment disorder with depressed mood     Is doing well on Sertraline 100 mg  Continue            Class 1 obesity due to excess calories without serious comorbidity with body mass index (BMI) of 32 0 to 32 9 in adult      Other Visit Diagnoses     Annual physical exam    -  Primary    Relevant Orders    Lipid Panel with Direct LDL reflex    Glucose, fasting    Colon cancer screening        Relevant Orders    Cologuard    Tetanus, diphtheria, and acellular pertussis (Tdap) vaccination declined              Immunizations and preventive care screenings were discussed with patient today  Appropriate education was printed on patient's after visit summary  Counseling:  Alcohol/drug use: discussed moderation in alcohol intake, the recommendations for healthy alcohol use, and avoidance of illicit drug use  Dental Health: discussed importance of regular tooth brushing, flossing, and dental visits  Exercise: the importance of regular exercise/physical activity was discussed  Recommend exercise 3-5 times per week for at least 30 minutes  Cologuard ordered  Refuses colonoscopy  Labs ordered for Sept   Declined Tdap    BMI Counseling: Body mass index is 32 39 kg/m²  The BMI is above normal  Nutrition recommendations include encouraging healthy choices of fruits and vegetables  Exercise recommendations include exercising 3-5 times per week  No pharmacotherapy was ordered  Rationale for BMI follow-up plan is due to patient being overweight or obese  Return in about 4 months (around 2022) for Recheck medication, Review Labs       Chief Complaint:     Chief Complaint   Patient presents with    Depression    Annual Exam      History of Present Illness: Adult Annual Physical   Patient here for a comprehensive physical exam  The patient reports problems - he was started on Zoloft in March and reports he is doing well on this  Mood is improved  he is sleeping better  Diet and Physical Activity  Diet/Nutrition: poor diet  No veggies/fruits  Exercise: no formal exercise  Depression Screening  PHQ-2/9 Depression Screening    Little interest or pleasure in doing things: 1 - several days  Feeling down, depressed, or hopeless: 1 - several days       General Health  Sleep: sleeps poorly  Hearing: no issues  Vision: goes for regular eye exams and wears glasses  Dental: no dental visits for >1 year and brushes teeth twice daily  Review of Systems:     Review of Systems   Constitutional: Negative for activity change, appetite change, chills, fatigue, fever and unexpected weight change  HENT: Negative for congestion, ear discharge, ear pain, postnasal drip, sinus pressure and sore throat  Eyes: Negative for discharge and visual disturbance  Respiratory: Negative for cough, shortness of breath and wheezing  Cardiovascular: Negative for chest pain, palpitations and leg swelling  Gastrointestinal: Negative for abdominal pain, constipation, diarrhea, nausea and vomiting  Endocrine: Negative for cold intolerance, heat intolerance, polydipsia and polyuria  Genitourinary: Negative for difficulty urinating and frequency  Musculoskeletal: Negative for arthralgias, back pain, joint swelling and myalgias  Skin: Negative for rash  Neurological: Negative for dizziness, weakness, light-headedness, numbness and headaches  Hematological: Negative for adenopathy  Psychiatric/Behavioral: Positive for dysphoric mood and sleep disturbance  Negative for behavioral problems, confusion and suicidal ideas  The patient is not nervous/anxious  Past Medical History:     No past medical history on file     Past Surgical History:     Past Surgical History:   Procedure Laterality Date    APPENDECTOMY      HAND SURGERY Right     nerve damage      Family History:     Family History   Problem Relation Age of Onset    Dementia Mother       Social History:     Social History     Socioeconomic History    Marital status:      Spouse name: None    Number of children: None    Years of education: None    Highest education level: None   Occupational History    None   Tobacco Use    Smoking status: Never Smoker    Smokeless tobacco: Former User     Quit date: 1990   Vaping Use    Vaping Use: Never used   Substance and Sexual Activity    Alcohol use: Yes     Comment: Occasionally     Drug use: Not Currently    Sexual activity: None   Other Topics Concern    None   Social History Narrative    Lives with wife, daughter     Works at [employer] Strain: Not on file   Food Insecurity: Not on file   Transportation Needs: Not on file   Physical Activity: Not on file   Stress: Not on file   Social Connections: Not on file   Intimate Partner Violence: Not on file   Housing Stability: Not on file      Current Medications:     Current Outpatient Medications   Medication Sig Dispense Refill    sertraline (Zoloft) 100 mg tablet Take 1 tablet (100 mg total) by mouth daily 30 tablet 1     No current facility-administered medications for this visit  Allergies:     No Known Allergies   Physical Exam:     /76   Pulse 64   Temp (!) 97 1 °F (36 2 °C)   Ht 5' 8" (1 727 m)   Wt 96 6 kg (213 lb)   SpO2 98%   BMI 32 39 kg/m²     Physical Exam  Vitals and nursing note reviewed  Constitutional:       General: He is not in acute distress  Appearance: He is well-developed  He is obese  He is not diaphoretic  HENT:      Head: Normocephalic and atraumatic        Right Ear: Tympanic membrane, ear canal and external ear normal       Left Ear: Tympanic membrane, ear canal and external ear normal  Mouth/Throat:      Mouth: Mucous membranes are moist       Dentition: Abnormal dentition  Pharynx: Oropharynx is clear  Eyes:      Conjunctiva/sclera: Conjunctivae normal    Cardiovascular:      Rate and Rhythm: Normal rate and regular rhythm  Heart sounds: Normal heart sounds  No murmur heard  No friction rub  No gallop  Pulmonary:      Effort: Pulmonary effort is normal  No respiratory distress  Breath sounds: Normal breath sounds  No stridor  No wheezing or rales  Chest:      Chest wall: No tenderness  Abdominal:      General: There is no distension  Palpations: Abdomen is soft  Tenderness: There is no abdominal tenderness  Musculoskeletal:         General: No swelling  Right lower leg: No edema  Left lower leg: No edema  Skin:     Findings: No rash  Neurological:      General: No focal deficit present  Mental Status: He is alert  Mental status is at baseline  Cranial Nerves: No cranial nerve deficit  Psychiatric:         Behavior: Behavior normal          Thought Content:  Thought content normal          Judgment: Judgment normal           Radha Saavedra MD  [address] [place] [address] [id number]

## 2022-05-19 NOTE — PROGRESS NOTES
Daily Note     Today's date: 2022  Patient name: Deyanira Gomez  : 1965  MRN: [id number]  Referring provider: Eric Baker PA-C  Dx:   Encounter Diagnosis     ICD-10-CM    1  Tendinitis of right rotator cuff  M75 81    2  Chronic right shoulder pain  M25 511     G89 29    3  Trapezius muscle spasm  M62 838                   Subjective: ***      Objective: See treatment diary below      Assessment: Tolerated treatment {Tolerated treatment :7475959759}  Patient {assessment:0578643691}      Plan: {PLAN:6099266260}     Precautions: standard      Manuals                                                                 Neuro Re-Ed             Webslide: M, L row             Bilateral ER with scapular retraction             Prone shoulder ext  h abd   10x3" ea  Ther Ex             Patient education: HEP review, pathophysiology GR            UBE  3' / 3'           UT str  HEP Reviewed           Corner str  HEP Reviewed           Sleeper str  HEP Reviewed           TB IR, ER  GTB 2x10 ea  ER - s/l  2x10           Cervical rotation SNAGS  10x ea  Ther Activity                                       Gait Training                                       Modalities                                       Access Code: [email or web address]  URL: [email or web address]  Date: 2022  Prepared by: Yury Nieto    Exercises  · Corner Pec Major Stretch - 1 x daily - 7 x weekly - 3 sets - 1 reps - 30 seconds hold  · Sleeper Stretch - 1 x daily - 7 x weekly - 3 sets - 1 reps - 30 seconds hold  · Seated Cervical Sidebending Stretch - 1 x daily - 7 x weekly - 3 sets - 1 reps - 30 seconds hold  · Prone Shoulder Extension - Single Arm - 1 x daily - 4 x weekly - 2 sets - 10 reps - 3 seconds hold  · Prone Single Arm Shoulder Horizontal Abduction with Scapular Retraction and Palm Down - 1 x daily - 4 x weekly - 2 sets - 10 reps - 3 seconds hold  · Standing Shoulder Row with Anchored Resistance - 1 x daily - 4 x weekly - 2 sets - 10 reps - 3 seconds hold  · Shoulder extension with resistance - Neutral - 1 x daily - 4 x weekly - 2 sets - 10 reps - 3 seconds hold  · Shoulder External Rotation with Anchored Resistance - 1 x daily - 4 x weekly - 2 sets - 10 reps  · Shoulder External Rotation and Scapular Retraction with Resistance - 1 x daily - 4 x weekly - 2 sets - 10 reps - 5 seconds hold  · Shoulder Internal Rotation with Resistance - 1 x daily - 4 x weekly - 2 sets - 10 reps  · Seated Assisted Cervical Rotation with Towel - 1 x daily - 7 x weekly - 2 sets - 10 reps - 3 seconds hold

## 2022-05-19 NOTE — PATIENT INSTRUCTIONS

## 2022-06-01 LAB — COLOGUARD RESULT REPORTABLE: NEGATIVE

## 2022-06-09 ENCOUNTER — OFFICE VISIT (OUTPATIENT)
Dept: OBGYN CLINIC | Facility: CLINIC | Age: 57
End: 2022-06-09
Payer: COMMERCIAL

## 2022-06-09 VITALS
HEART RATE: 62 BPM | BODY MASS INDEX: 32.58 KG/M2 | HEIGHT: 68 IN | DIASTOLIC BLOOD PRESSURE: 77 MMHG | WEIGHT: 215 LBS | SYSTOLIC BLOOD PRESSURE: 112 MMHG | RESPIRATION RATE: 18 BRPM

## 2022-06-09 DIAGNOSIS — M75.81 TENDINITIS OF RIGHT ROTATOR CUFF: Primary | ICD-10-CM

## 2022-06-09 DIAGNOSIS — M62.838 TRAPEZIUS MUSCLE SPASM: ICD-10-CM

## 2022-06-09 DIAGNOSIS — G89.29 CHRONIC RIGHT SHOULDER PAIN: ICD-10-CM

## 2022-06-09 DIAGNOSIS — M75.41 IMPINGEMENT SYNDROME OF RIGHT SHOULDER: ICD-10-CM

## 2022-06-09 DIAGNOSIS — M25.511 CHRONIC RIGHT SHOULDER PAIN: ICD-10-CM

## 2022-06-09 PROCEDURE — 1036F TOBACCO NON-USER: CPT | Performed by: ORTHOPAEDIC SURGERY

## 2022-06-09 PROCEDURE — 3008F BODY MASS INDEX DOCD: CPT | Performed by: ORTHOPAEDIC SURGERY

## 2022-06-09 PROCEDURE — 99213 OFFICE O/P EST LOW 20 MIN: CPT | Performed by: ORTHOPAEDIC SURGERY

## 2022-06-09 NOTE — PROGRESS NOTES
Patient Name:  Modesto Ribeiro  MRN:  [id number]    [address] [place] [address]     1  Tendinitis of right rotator cuff    2  Trapezius muscle spasm    3  Impingement syndrome of right shoulder    4  Chronic right shoulder pain        62 y o  male with right shoulder rotator cuff tendinitis and upper trapezius spasms  Patient is about 95% improved  He was advised to continue his home exercises as much as he is able to  NSAIDS as needed  Ice as needed for pain  We will see the patient back on an as needed basis or if symtoms worsen  History of the Present Illness   Modesto Ribeiro is a 62 y o  male with right shoulder rotator cuff tendinitis and upper trapezius spasm  Patient was last seen in the clinic on 4/14/22, at which time conservative treatment options were discussed consisting of formal PT to work on proper lifting mechanics, graston/manual therapy upper trapezius, strengthening of periscapular/postural and rotator cuff musculature, full passive and active range of motion, corticosteroid injections, topical voltaren  Patient was provided with a CSI into the subacromial bursa for pain relief  Advised if pain persist further diagnostic imaging will be considered  Today, the patient notes the first day after the CSI he had increased pain and then had relief from the CSI  HE notes having about 95% of relief and notes increased mobility  Patient notes going to formal PT once and was provided home exercises  He notes being unable to perform his home exercises as often due to working long hours  He notes having mild pain in the right shoulder  Notes he is able to move his right shoulder without significant pain  Review of Systems     Review of Systems   Constitutional: Negative for appetite change, chills, fever and unexpected weight change  HENT: Negative for congestion, hearing loss, nosebleeds, sore throat and trouble swallowing  Eyes: Negative for pain, redness and visual disturbance     Respiratory: Negative for cough, shortness of breath and wheezing  Cardiovascular: Negative for chest pain, palpitations and leg swelling  Gastrointestinal: Negative for abdominal pain, nausea and vomiting  Endocrine: Negative for cold intolerance, heat intolerance, polydipsia and polyuria  Genitourinary: Negative for dysuria and hematuria  Musculoskeletal: Negative for gait problem and myalgias  Skin: Negative for rash and wound  Neurological: Negative for dizziness, light-headedness, numbness and headaches  Psychiatric/Behavioral: Negative for decreased concentration, dysphoric mood and suicidal ideas  The patient is not nervous/anxious  Physical Exam     Resp 18   Ht 5' 8" (1 727 m)   Wt 97 5 kg (215 lb)   BMI 32 69 kg/m²     Right Shoulder: Active range of motion   170 degrees forward flexion  160 degrees abduction  70 degrees external rotation   1 level restriction internal rotation      Passive range of motion    There is 5/5 strength with external rotation testing at the side  Empty can testing is negative   Belly press test is negative  Francois test is negative   [place]'s test is negative    Speed's test is Negative   The patient is neurovascularly intact distally in the extremity  Data Review     I have personally reviewed pertinent films in PACS, and my interpretation follows      No new imaging obtained    Social History     Tobacco Use    Smoking status: Never Smoker    Smokeless tobacco: Former User     Quit date: 1990   Vaping Use    Vaping Use: Never used   Substance Use Topics    Alcohol use: Yes     Comment: Occasionally     Drug use: Not Currently           Procedures    Scribe Attestation    I,:  Lalo Lehman MA am acting as a scribe while in the presence of the attending physician :       I,:  Nazario Chen DO personally performed the services described in this documentation    as scribed in my presence :

## 2022-07-18 DIAGNOSIS — F43.21 GRIEF: ICD-10-CM

## 2022-07-18 RX ORDER — SERTRALINE HYDROCHLORIDE 100 MG/1
100 TABLET, FILM COATED ORAL DAILY
Qty: 30 TABLET | Refills: 5 | Status: SHIPPED | OUTPATIENT
Start: 2022-07-18

## 2023-08-18 ENCOUNTER — HOSPITAL ENCOUNTER (OUTPATIENT)
Dept: MRI IMAGING | Facility: HOSPITAL | Age: 58
End: 2023-08-18
Attending: PODIATRIST
Payer: COMMERCIAL

## 2023-08-18 DIAGNOSIS — M72.2 PLANTAR FASCIAL FIBROMATOSIS: ICD-10-CM

## 2023-08-18 PROCEDURE — 73721 MRI JNT OF LWR EXTRE W/O DYE: CPT

## 2023-10-05 NOTE — PRE-PROCEDURE INSTRUCTIONS
Pre-Surgery Instructions:   Medication Instructions   • Multiple Vitamins-Minerals (MULTI-LELA PO) Stop taking 3 days prior to surgery. Medication instructions for day surgery reviewed. Please use only a sip of water to take your instructed medications. Avoid all over the counter vitamins, supplements and NSAIDS for one week prior to surgery per anesthesia guidelines. Tylenol is ok to take as needed. You will receive a call one business day prior to surgery with an arrival time and hospital directions. If your surgery is scheduled on a Monday, the hospital will be calling you on the Friday prior to your surgery. If you have not heard from anyone by 8pm, please call the hospital supervisor through the hospital  at [phone]. Pranay Alvarez [phone]). Do not eat or drink anything after midnight the night before your surgery, including candy, mints, lifesavers, or chewing gum. Do not drink alcohol 24hrs before your surgery. Try not to smoke at least 24hrs before your surgery. Follow the pre surgery showering instructions as listed in the [facility] Surgical Experience Booklet” or otherwise provided by your surgeon's office. Do not shave the surgical area 24 hours before surgery. Do not apply any lotions, creams, including makeup, cologne, deodorant, or perfumes after showering on the day of your surgery. No contact lenses, eye make-up, or artificial eyelashes. Remove nail polish, including gel polish, and any artificial, gel, or acrylic nails if possible. Remove all jewelry including rings and body piercing jewelry. Wear causal clothing that is easy to take on and off. Consider your type of surgery. Keep any valuables, jewelry, piercings at home. Please bring any specially ordered equipment (sling, braces) if indicated. Arrange for a responsible person to drive you to and from the hospital on the day of your surgery. Visitor Guidelines discussed.      Call the surgeon's office with any new illnesses, exposures, or additional questions prior to surgery. Please reference your [facility] Surgical Experience Booklet” for additional information to prepare for your upcoming surgery.

## 2023-10-07 ENCOUNTER — ANESTHESIA EVENT (OUTPATIENT)
Dept: PERIOP | Facility: HOSPITAL | Age: 58
End: 2023-10-07
Payer: COMMERCIAL

## 2023-10-09 ENCOUNTER — HOSPITAL ENCOUNTER (OUTPATIENT)
Facility: HOSPITAL | Age: 58
Setting detail: OUTPATIENT SURGERY
Discharge: HOME | End: 2023-10-09
Attending: PODIATRIST | Admitting: PODIATRIST
Payer: COMMERCIAL

## 2023-10-09 ENCOUNTER — ANESTHESIA (OUTPATIENT)
Dept: PERIOP | Facility: HOSPITAL | Age: 58
End: 2023-10-09
Payer: COMMERCIAL

## 2023-10-09 VITALS
OXYGEN SATURATION: 93 % | TEMPERATURE: 98 F | WEIGHT: 229 LBS | HEIGHT: 68 IN | HEART RATE: 69 BPM | SYSTOLIC BLOOD PRESSURE: 118 MMHG | BODY MASS INDEX: 34.71 KG/M2 | DIASTOLIC BLOOD PRESSURE: 81 MMHG | RESPIRATION RATE: 18 BRPM

## 2023-10-09 DIAGNOSIS — Z98.890 POST-OPERATIVE STATE: Primary | ICD-10-CM

## 2023-10-09 RX ORDER — LIDOCAINE HYDROCHLORIDE 10 MG/ML
INJECTION, SOLUTION EPIDURAL; INFILTRATION; INTRACAUDAL; PERINEURAL AS NEEDED
Status: DISCONTINUED | OUTPATIENT
Start: 2023-10-09 | End: 2023-10-09

## 2023-10-09 RX ORDER — MIDAZOLAM HYDROCHLORIDE 2 MG/2ML
INJECTION, SOLUTION INTRAMUSCULAR; INTRAVENOUS AS NEEDED
Status: DISCONTINUED | OUTPATIENT
Start: 2023-10-09 | End: 2023-10-09

## 2023-10-09 RX ORDER — DEXAMETHASONE SODIUM PHOSPHATE 10 MG/ML
INJECTION, SOLUTION INTRAMUSCULAR; INTRAVENOUS AS NEEDED
Status: DISCONTINUED | OUTPATIENT
Start: 2023-10-09 | End: 2023-10-09

## 2023-10-09 RX ORDER — OXYCODONE HYDROCHLORIDE AND ACETAMINOPHEN 5; 325 MG/1; MG/1
1 TABLET ORAL ONCE AS NEEDED
Status: DISCONTINUED | OUTPATIENT
Start: 2023-10-09 | End: 2023-10-09 | Stop reason: HOSPADM

## 2023-10-09 RX ORDER — BUPIVACAINE HYDROCHLORIDE 5 MG/ML
INJECTION, SOLUTION EPIDURAL; INTRACAUDAL AS NEEDED
Status: DISCONTINUED | OUTPATIENT
Start: 2023-10-09 | End: 2023-10-09 | Stop reason: HOSPADM

## 2023-10-09 RX ORDER — MAGNESIUM HYDROXIDE 1200 MG/15ML
LIQUID ORAL AS NEEDED
Status: DISCONTINUED | OUTPATIENT
Start: 2023-10-09 | End: 2023-10-09 | Stop reason: HOSPADM

## 2023-10-09 RX ORDER — PROPOFOL 10 MG/ML
INJECTION, EMULSION INTRAVENOUS AS NEEDED
Status: DISCONTINUED | OUTPATIENT
Start: 2023-10-09 | End: 2023-10-09

## 2023-10-09 RX ORDER — ONDANSETRON 2 MG/ML
INJECTION INTRAMUSCULAR; INTRAVENOUS AS NEEDED
Status: DISCONTINUED | OUTPATIENT
Start: 2023-10-09 | End: 2023-10-09

## 2023-10-09 RX ORDER — OXYCODONE HYDROCHLORIDE AND ACETAMINOPHEN 5; 325 MG/1; MG/1
1 TABLET ORAL EVERY 4 HOURS PRN
Qty: 20 TABLET | Refills: 0 | Status: SHIPPED | OUTPATIENT
Start: 2023-10-09 | End: 2023-10-19

## 2023-10-09 RX ORDER — FENTANYL CITRATE/PF 50 MCG/ML
50 SYRINGE (ML) INJECTION
Status: DISCONTINUED | OUTPATIENT
Start: 2023-10-09 | End: 2023-10-09 | Stop reason: HOSPADM

## 2023-10-09 RX ORDER — LIDOCAINE HYDROCHLORIDE 20 MG/ML
INJECTION, SOLUTION EPIDURAL; INFILTRATION; INTRACAUDAL; PERINEURAL AS NEEDED
Status: DISCONTINUED | OUTPATIENT
Start: 2023-10-09 | End: 2023-10-09 | Stop reason: HOSPADM

## 2023-10-09 RX ORDER — SODIUM CHLORIDE, SODIUM LACTATE, POTASSIUM CHLORIDE, CALCIUM CHLORIDE 600; 310; 30; 20 MG/100ML; MG/100ML; MG/100ML; MG/100ML
50 INJECTION, SOLUTION INTRAVENOUS CONTINUOUS
Status: DISCONTINUED | OUTPATIENT
Start: 2023-10-09 | End: 2023-10-09 | Stop reason: HOSPADM

## 2023-10-09 RX ORDER — FENTANYL CITRATE 50 UG/ML
INJECTION, SOLUTION INTRAMUSCULAR; INTRAVENOUS AS NEEDED
Status: DISCONTINUED | OUTPATIENT
Start: 2023-10-09 | End: 2023-10-09

## 2023-10-09 RX ORDER — CHLORHEXIDINE GLUCONATE ORAL RINSE 1.2 MG/ML
15 SOLUTION DENTAL ONCE
Status: COMPLETED | OUTPATIENT
Start: 2023-10-09 | End: 2023-10-09

## 2023-10-09 RX ORDER — KETOROLAC TROMETHAMINE 30 MG/ML
INJECTION, SOLUTION INTRAMUSCULAR; INTRAVENOUS AS NEEDED
Status: DISCONTINUED | OUTPATIENT
Start: 2023-10-09 | End: 2023-10-09

## 2023-10-09 RX ORDER — CEFAZOLIN SODIUM 2 G/50ML
2000 SOLUTION INTRAVENOUS ONCE
Status: COMPLETED | OUTPATIENT
Start: 2023-10-09 | End: 2023-10-09

## 2023-10-09 RX ADMIN — KETOROLAC TROMETHAMINE 30 MG: 30 INJECTION, SOLUTION INTRAMUSCULAR; INTRAVENOUS at 15:09

## 2023-10-09 RX ADMIN — DEXAMETHASONE SODIUM PHOSPHATE 10 MG: 10 INJECTION, SOLUTION INTRAMUSCULAR; INTRAVENOUS at 14:49

## 2023-10-09 RX ADMIN — ONDANSETRON 4 MG: 2 INJECTION INTRAMUSCULAR; INTRAVENOUS at 14:49

## 2023-10-09 RX ADMIN — MIDAZOLAM 2 MG: 1 INJECTION INTRAMUSCULAR; INTRAVENOUS at 14:41

## 2023-10-09 RX ADMIN — LIDOCAINE HYDROCHLORIDE 50 MG: 10 INJECTION, SOLUTION EPIDURAL; INFILTRATION; INTRACAUDAL; PERINEURAL at 14:45

## 2023-10-09 RX ADMIN — PROPOFOL 200 MG: 10 INJECTION, EMULSION INTRAVENOUS at 14:45

## 2023-10-09 RX ADMIN — CHLORHEXIDINE GLUCONATE 0.12% ORAL RINSE 15 ML: 1.2 LIQUID ORAL at 12:27

## 2023-10-09 RX ADMIN — FENTANYL CITRATE 50 MCG: 50 INJECTION, SOLUTION INTRAMUSCULAR; INTRAVENOUS at 14:44

## 2023-10-09 RX ADMIN — SODIUM CHLORIDE, SODIUM LACTATE, POTASSIUM CHLORIDE, AND CALCIUM CHLORIDE: .6; .31; .03; .02 INJECTION, SOLUTION INTRAVENOUS at 14:32

## 2023-10-09 RX ADMIN — FENTANYL CITRATE 50 MCG: 50 INJECTION, SOLUTION INTRAMUSCULAR; INTRAVENOUS at 14:56

## 2023-10-09 RX ADMIN — CEFAZOLIN SODIUM 2000 MG: 2 SOLUTION INTRAVENOUS at 14:40

## 2023-10-09 NOTE — ANESTHESIA POSTPROCEDURE EVALUATION
Post-Op Assessment Note    CV Status:  Stable  Pain Score: 0    Pain management: adequate     Mental Status:  Alert   Hydration Status:  Stable   PONV Controlled:  Controlled   Airway Patency:  Patent      Post Op Vitals Reviewed: Yes      Staff: CRNA         No notable events documented.     BP   125/75   Temp  97.3   Pulse  74   Resp   20   SpO2   95

## 2023-10-09 NOTE — OP NOTE
OPERATIVE REPORT - Podiatry  PATIENT NAME: Samanta Perez    :  1965  MRN: [id number]  Pt Location:  OR ROOM 10    SURGERY DATE: 10/9/2023    Surgeon(s) and Role:     * Gurmeet Woodard DPM - Primary     * Maurizio St DPM - Assisting    Pre-op Diagnosis:  Plantar fascial fibromatosis [M72.2]    Post-Op Diagnosis Codes:     * Plantar fascial fibromatosis [M72.2]    Procedure(s) (LRB):  PARTIAL FASCIECTOMY (Left)    Specimen(s):  * No specimens in log *    Estimated Blood Loss:   Minimal    Drains:  * No LDAs found *    Anesthesia Type:   IV Sedation with Anesthesia with 10 ml of 2% Lidocaine    Hemostasis:  Ankle tourniquet 250mmHg    Materials:  4-0 Prolene suture    Operative Findings:  Consistent with pre op diagnosis    Complications:   None    Procedure and Technique:     Under mild sedation, the patient was brought into the operating room and placed on the operating room table in the supine position. IV sedation was achieved by anesthesia team and a universal timeout was performed where all parties are in agreement of correct patient, correct procedure and correct site. A pneumatic tourniquet was then placed over the patient's left ankle with ample padding. A local V block was performed consisting of 8 ml of 2% Lidocaine. The foot was then prepped and draped in the usual aseptic manner. An esmarch bandage was used to exsangunate the foot and the pneumatic tourniquet was then inflated to 250mmHg. Attention was then directed to the left plantar heel. A skin marker was used to álvaro a transverse linear incision just distal to the insertion point of the medial band of the plantar fascia on the calcaneus. This skin incision was made using a 15 blade. Blunt dissection was carried down to the plantar fascia which was clearly visualized and palpated. With the toes held in dorsiflexion to tension the plantar fascia, the medial band of the plantar fascia was transected using a 15 blade.  Underlying muscle belly was directly visualized and clinically there was noted to be release of tension along the medial band of the plantar fascia. The central and lateral bands remained intact and no residual strands of the medial band were palpated. The site was irrigated with normal saline. Skin closure was obtained using 4-0 Prolene suture in interrupted fashion. A post operative block was administered consisting of 9ml of 0.5% Bupivacaine. The site was dressed with betadine soaked adaptic, 4x4 gauze, matthew, and coban. The tourniquet was deflated at approximately 16 min and normal hyperemic response was noted to all digits. The patient tolerated the procedure and anesthesia well without immediate complications and transferred to PACU with vital signs stable. Dr. Thien Ohara was present during the entire procedure and participated in all key aspects. SIGNATURE: Aleena Ennis DPM  DATE: October 9, 2023  TIME: 3:27 PM      Portions of the record may have been created with voice recognition software. Occasional wrong word or "sound a like" substitutions may have occurred due to the inherent limitations of voice recognition software. Read the chart carefully and recognize, using context, where substitutions have occurred.

## 2023-10-09 NOTE — ANESTHESIA PREPROCEDURE EVALUATION
Procedure:  PARTIAL FASCIECTOMY (Left: Foot)    Relevant Problems   Other   (+) Adjustment disorder with depressed mood   (+) Class 1 obesity due to excess calories without serious comorbidity with body mass index (BMI) of 32.0 to 32.9 in adult        Physical Exam    Airway    Mallampati score: II  TM Distance: <3 FB  Neck ROM: full     Dental   upper dentures and lower dentures,     Cardiovascular  Cardiovascular exam normal    Pulmonary  Pulmonary exam normal     Other Findings        Anesthesia Plan  ASA Score- 2     Anesthesia Type- IV sedation with anesthesia with ASA Monitors. Additional Monitors:   Airway Plan: LMA. Plan Factors-Exercise tolerance (METS): >4 METS. Chart reviewed. Existing labs reviewed. Patient is not a current smoker. Patient not instructed to abstain from smoking on day of procedure. Patient did not smoke on day of surgery. Induction- intravenous. Postoperative Plan-     Informed Consent- Anesthetic plan and risks discussed with patient. I personally reviewed this patient with the CRNA. Discussed and agreed on the Anesthesia Plan with the CRNA. Constance Metcalf

## 2023-10-09 NOTE — DISCHARGE SUMMARY
Discharge Summary Outpatient Procedure Podiatry -   Radha Gallardo 62 y.o. male MRN: [id number]  Unit/Bed#: OR POOL Encounter: [id number]    Admission Date: 10/9/2023     Admitting Diagnosis: Plantar fascial fibromatosis [M72.2]    Discharge Diagnosis: same    Procedures Performed: PARTIAL FASCIECTOMY: 06164 (CPT®) (left)    Complications: none    Condition at Discharge: stable    Discharge instructions/Information to patient and family:   See after visit summary for information provided to patient and family. Provisions for Follow-Up Care/Important appointments:  See after visit summary for information related to follow-up care and any pertinent home health orders. Discharge Medications:  See after visit summary for reconciled discharge medications provided to patient and family.

## 2023-10-09 NOTE — DISCHARGE INSTR - AVS FIRST PAGE
Discharge Instructions - Podiatry    Weight Bearing Status: Weight bearing as tolerated wearing a surgical shoe on the left foot at all times. Use crutches for assistance. Pain: Continue analgesics as directed    Follow-up appointment instructions: Please go to your post op office visit in about 1 week with Dr. Adriel Dai. Contact sooner if any increase in pain, or signs of infection occur    Wound Care: Leave dressings clean, dry, and intact to your first post op office visit. Keep the foot elevated as much as possible in the first 48 hours post op to minimize bleeding, swelling, and pain.

## 2023-10-09 NOTE — INTERVAL H&P NOTE
H&P reviewed. After examining the patient I find no changes in the patients condition since the H&P had been written.     Vitals:    10/09/23 1226   BP: 139/100   Pulse: 74   Resp: 18   Temp: (!) 97.2 °F (36.2 °C)   SpO2: 94%

## (undated) DEVICE — SUT VICRYL 3-0 SH 27 IN J416H

## (undated) DEVICE — SYRINGE 10ML LL

## (undated) DEVICE — SUT PROLENE 4-0 PS-2 18 IN 8682G

## (undated) DEVICE — STRETCH BANDAGE: Brand: CURITY

## (undated) DEVICE — STOCKINETTE 2P PREROLLD 6X60

## (undated) DEVICE — STERILE POLYISOPRENE POWDER-FREE SURGICAL GLOVES: Brand: PROTEXIS

## (undated) DEVICE — DISPOSABLE OR TOWEL: Brand: CARDINAL HEALTH

## (undated) DEVICE — STANDARD SURGICAL GOWN, L: Brand: CONVERTORS

## (undated) DEVICE — POV-IOD SOLUTION 4OZ BT

## (undated) DEVICE — NEEDLE 25G X 1 1/2

## (undated) DEVICE — SYRINGE 10ML LL CONTROL TOP

## (undated) DEVICE — CHLORAPREP HI-LITE 26ML ORANGE

## (undated) DEVICE — SCD SEQUENTIAL COMPRESSION COMFORT SLEEVE MEDIUM KNEE LENGTH: Brand: KENDALL SCD

## (undated) DEVICE — INTENDED FOR TISSUE SEPARATION, AND OTHER PROCEDURES THAT REQUIRE A SHARP SURGICAL BLADE TO PUNCTURE OR CUT.: Brand: BARD-PARKER ® SAFETYLOCK CARBON RIB-BACK BLADES

## (undated) DEVICE — PENCIL ELECTROSURG E-Z CLEAN -0035H

## (undated) DEVICE — SINGLE PORT MANIFOLD: Brand: NEPTUNE 2

## (undated) DEVICE — BETHLEHEM UNIVERSAL  MIONR EXT: Brand: CARDINAL HEALTH

## (undated) DEVICE — CURITY NON-ADHERENT STRIPS: Brand: CURITY

## (undated) DEVICE — NEEDLE BLUNT 18 G X 1 1/2IN

## (undated) DEVICE — INTENDED FOR TISSUE SEPARATION, AND OTHER PROCEDURES THAT REQUIRE A SHARP SURGICAL BLADE TO PUNCTURE OR CUT.: Brand: BARD-PARKER SAFETY BLADES SIZE 15, STERILE

## (undated) DEVICE — CUFF TOURNIQUET 18 X 4 IN QUICK CONNECT DISP 1 BLADDER